# Patient Record
Sex: FEMALE | NOT HISPANIC OR LATINO | Employment: UNEMPLOYED | URBAN - METROPOLITAN AREA
[De-identification: names, ages, dates, MRNs, and addresses within clinical notes are randomized per-mention and may not be internally consistent; named-entity substitution may affect disease eponyms.]

---

## 2023-10-10 ENCOUNTER — TELEPHONE (OUTPATIENT)
Dept: DATA CONVERSION | Facility: HOSPITAL | Age: 33
End: 2023-10-10
Payer: COMMERCIAL

## 2023-10-10 NOTE — TELEPHONE ENCOUNTER
"Pt reports new symptom of \"swooshing\" in both ears intermittently.  States she has experienced this the last 3 days.  Has been taking Diamox only once a day for a week instead of as prescribed BID.  States it's been a crazy week and she has forgotten 2nd dose most days in last week.    No visual disturbances, last check up was 6 months ago.  No headache or other c/o.    She wonders if she needs to be seen before her upcoming January appt.  Thank you.  "

## 2023-12-20 ENCOUNTER — APPOINTMENT (OUTPATIENT)
Dept: PRIMARY CARE | Facility: CLINIC | Age: 33
End: 2023-12-20
Payer: COMMERCIAL

## 2024-01-22 ENCOUNTER — TELEPHONE (OUTPATIENT)
Dept: NEUROLOGY | Facility: HOSPITAL | Age: 34
End: 2024-01-22
Payer: COMMERCIAL

## 2024-01-22 ENCOUNTER — APPOINTMENT (OUTPATIENT)
Dept: NEUROLOGY | Facility: HOSPITAL | Age: 34
End: 2024-01-22

## 2024-01-22 DIAGNOSIS — G93.2 PSEUDOTUMOR CEREBRI: Primary | ICD-10-CM

## 2024-01-22 RX ORDER — ACETAZOLAMIDE 500 MG/1
500 CAPSULE, EXTENDED RELEASE ORAL 2 TIMES DAILY
COMMUNITY
End: 2024-01-22 | Stop reason: SDUPTHER

## 2024-01-22 RX ORDER — ACETAZOLAMIDE 500 MG/1
500 CAPSULE, EXTENDED RELEASE ORAL 2 TIMES DAILY
Qty: 60 CAPSULE | Refills: 0 | Status: SHIPPED | OUTPATIENT
Start: 2024-01-22 | End: 2024-03-14 | Stop reason: SDUPTHER

## 2024-01-22 NOTE — TELEPHONE ENCOUNTER
Pt requests refill of Acetazolamide  mg capsules, 1 cap bid please be sent to [facility].    Thank you.

## 2024-03-08 ENCOUNTER — OFFICE VISIT (OUTPATIENT)
Dept: PRIMARY CARE | Facility: CLINIC | Age: 34
End: 2024-03-08
Payer: COMMERCIAL

## 2024-03-08 VITALS
HEART RATE: 76 BPM | SYSTOLIC BLOOD PRESSURE: 108 MMHG | BODY MASS INDEX: 36.36 KG/M2 | DIASTOLIC BLOOD PRESSURE: 75 MMHG | TEMPERATURE: 97.8 F | OXYGEN SATURATION: 98 % | HEIGHT: 62 IN | RESPIRATION RATE: 18 BRPM | WEIGHT: 197.6 LBS

## 2024-03-08 DIAGNOSIS — R06.2 WHEEZES: ICD-10-CM

## 2024-03-08 DIAGNOSIS — Z13.220 SCREENING, LIPID: ICD-10-CM

## 2024-03-08 DIAGNOSIS — Z13.29 THYROID DISORDER SCREEN: ICD-10-CM

## 2024-03-08 DIAGNOSIS — R00.2 PALPITATIONS: ICD-10-CM

## 2024-03-08 DIAGNOSIS — G93.2 PSEUDOTUMOR CEREBRI: ICD-10-CM

## 2024-03-08 DIAGNOSIS — F41.1 GAD (GENERALIZED ANXIETY DISORDER): Primary | ICD-10-CM

## 2024-03-08 PROCEDURE — 99205 OFFICE O/P NEW HI 60 MIN: CPT | Performed by: NURSE PRACTITIONER

## 2024-03-08 PROCEDURE — 4004F PT TOBACCO SCREEN RCVD TLK: CPT | Performed by: NURSE PRACTITIONER

## 2024-03-08 PROCEDURE — 93000 ELECTROCARDIOGRAM COMPLETE: CPT | Performed by: NURSE PRACTITIONER

## 2024-03-08 RX ORDER — ACETAZOLAMIDE 500 MG/1
CAPSULE, EXTENDED RELEASE ORAL
COMMUNITY
Start: 2024-01-22 | End: 2024-03-14 | Stop reason: SDUPTHER

## 2024-03-08 RX ORDER — METHYLPREDNISOLONE 4 MG/1
TABLET ORAL
Qty: 21 TABLET | Refills: 0 | Status: SHIPPED | OUTPATIENT
Start: 2024-03-08 | End: 2024-03-20 | Stop reason: WASHOUT

## 2024-03-08 ASSESSMENT — ENCOUNTER SYMPTOMS
ACTIVITY CHANGE: 0
PALPITATIONS: 0
SLEEP DISTURBANCE: 0
DIZZINESS: 0
MYALGIAS: 0
ABDOMINAL PAIN: 0
CONFUSION: 0
ARTHRALGIAS: 0
APNEA: 0
SPEECH DIFFICULTY: 0
FEVER: 0
CHILLS: 0
COUGH: 1
HEADACHES: 0
VOMITING: 0
SHORTNESS OF BREATH: 0
NAUSEA: 0
CONSTITUTIONAL NEGATIVE: 1
NERVOUS/ANXIOUS: 0
WEAKNESS: 0
SORE THROAT: 0

## 2024-03-08 ASSESSMENT — ANXIETY QUESTIONNAIRES
2. NOT BEING ABLE TO STOP OR CONTROL WORRYING: NEARLY EVERY DAY
7. FEELING AFRAID AS IF SOMETHING AWFUL MIGHT HAPPEN: NEARLY EVERY DAY
6. BECOMING EASILY ANNOYED OR IRRITABLE: NEARLY EVERY DAY
4. TROUBLE RELAXING: NOT AT ALL
5. BEING SO RESTLESS THAT IT IS HARD TO SIT STILL: SEVERAL DAYS
GAD7 TOTAL SCORE: 16
3. WORRYING TOO MUCH ABOUT DIFFERENT THINGS: NEARLY EVERY DAY
IF YOU CHECKED OFF ANY PROBLEMS ON THIS QUESTIONNAIRE, HOW DIFFICULT HAVE THESE PROBLEMS MADE IT FOR YOU TO DO YOUR WORK, TAKE CARE OF THINGS AT HOME, OR GET ALONG WITH OTHER PEOPLE: SOMEWHAT DIFFICULT
1. FEELING NERVOUS, ANXIOUS, OR ON EDGE: NEARLY EVERY DAY

## 2024-03-08 ASSESSMENT — PATIENT HEALTH QUESTIONNAIRE - PHQ9
10. IF YOU CHECKED OFF ANY PROBLEMS, HOW DIFFICULT HAVE THESE PROBLEMS MADE IT FOR YOU TO DO YOUR WORK, TAKE CARE OF THINGS AT HOME, OR GET ALONG WITH OTHER PEOPLE: VERY DIFFICULT
SUM OF ALL RESPONSES TO PHQ9 QUESTIONS 1 AND 2: 1
2. FEELING DOWN, DEPRESSED OR HOPELESS: SEVERAL DAYS
1. LITTLE INTEREST OR PLEASURE IN DOING THINGS: NOT AT ALL

## 2024-03-08 NOTE — PROGRESS NOTES
"Subjective   Patient ID: Medina Mello is a 33 y.o. female who presents for New Patient Visit, Palpitations (Reports for 2 weeks in February ), Heartburn, and Throat Problem (Reports \"clicking\" in throat when she swallows past 2-3 weeks ).    34 yo female patient , new to establish. PMH pseudotumor cerebri.   On diamox  mg BID   Hx of kidney stones, monitoring closely while on diamox  Chronic migraines: managed by neuro. PRN ibuprofen.   Controlled on diamox  Visual aura    Chest pain, heartburn, clicking in my throat when I swallow and palpitations   Pain feels like its under left breast   Symptoms present for approximately 2-3 months however chest pain more so a month  Chest pain has resolved   Heartburn is still there.     Recently treated by urgent care for flu b. Still some congestion                Review of Systems   Constitutional: Negative.  Negative for activity change, chills and fever.   HENT:  Negative for congestion, postnasal drip, sneezing and sore throat.    Respiratory:  Positive for cough. Negative for apnea and shortness of breath.    Cardiovascular:  Negative for chest pain and palpitations.   Gastrointestinal:  Negative for abdominal pain, nausea and vomiting.   Musculoskeletal:  Negative for arthralgias and myalgias.   Neurological:  Negative for dizziness, syncope, speech difficulty, weakness and headaches.   Psychiatric/Behavioral:  Negative for confusion and sleep disturbance. The patient is not nervous/anxious.        Objective   /75   Pulse 76   Temp 36.6 °C (97.8 °F) (Temporal)   Resp 18   Ht 1.575 m (5' 2\")   Wt 89.6 kg (197 lb 9.6 oz)   SpO2 98%   BMI 36.14 kg/m²     Physical Exam  Vitals reviewed.   Constitutional:       Appearance: Normal appearance.   HENT:      Head: Normocephalic.   Cardiovascular:      Rate and Rhythm: Normal rate and regular rhythm.      Pulses: Normal pulses.      Heart sounds: Normal heart sounds.   Pulmonary:      Effort: Pulmonary effort " is normal.      Breath sounds: Normal breath sounds.   Abdominal:      General: Bowel sounds are normal.   Neurological:      Mental Status: She is alert and oriented to person, place, and time.   Psychiatric:         Mood and Affect: Mood normal.         Behavior: Behavior normal.         Assessment/Plan   Problem List Items Addressed This Visit             ICD-10-CM    Pseudotumor cerebri G93.2     Continue diamox   Follow up with neuro            Relevant Orders    CBC and Auto Differential    Comprehensive Metabolic Panel    SOLOMON (generalized anxiety disorder) - Primary F41.1     Continue current poc  Follow up 6 weeks   Psychology referral   Declines meds   Palpitations associated.          Relevant Orders    Referral to Psychology    Wheezes R06.2     Medrol dose saray   Post URI  Call for worsening   Continue albuterol inhaler prn          Relevant Medications    methylPREDNISolone (Medrol Dospak) 4 mg tablets    Palpitations R00.2     Holter monitor.   Patient given phone number to call for appt .         Relevant Orders    Holter or Event Cardiac Monitor    CBC and Auto Differential    Comprehensive Metabolic Panel    Vitamin D 1,25 Dihydroxy (for eval of hypercalcemia)    Vitamin B12    Iron and TIBC    Ferritin    ECG 12 lead (Clinic Performed) (Completed)    Thyroid disorder screen Z13.29     Thyroid screening d/t palpitations         Relevant Orders    TSH with reflex to Free T4 if abnormal     Other Visit Diagnoses         Codes    Screening, lipid     Z13.220    Relevant Orders    Lipid Panel

## 2024-03-08 NOTE — ASSESSMENT & PLAN NOTE
Continue current poc  Follow up 6 weeks   Psychology referral   Declines meds   Palpitations associated.

## 2024-03-11 ENCOUNTER — APPOINTMENT (OUTPATIENT)
Dept: NEUROLOGY | Facility: HOSPITAL | Age: 34
End: 2024-03-11
Payer: COMMERCIAL

## 2024-03-12 ENCOUNTER — LAB (OUTPATIENT)
Dept: LAB | Facility: LAB | Age: 34
End: 2024-03-12
Payer: COMMERCIAL

## 2024-03-12 DIAGNOSIS — Z13.29 THYROID DISORDER SCREEN: ICD-10-CM

## 2024-03-12 DIAGNOSIS — G93.2 PSEUDOTUMOR CEREBRI: ICD-10-CM

## 2024-03-12 DIAGNOSIS — R00.2 PALPITATIONS: ICD-10-CM

## 2024-03-12 DIAGNOSIS — Z13.220 SCREENING, LIPID: ICD-10-CM

## 2024-03-12 LAB
ALBUMIN SERPL BCP-MCNC: 3.7 G/DL (ref 3.4–5)
ALP SERPL-CCNC: 62 U/L (ref 33–110)
ALT SERPL W P-5'-P-CCNC: 12 U/L (ref 7–45)
ANION GAP SERPL CALC-SCNC: 10 MMOL/L (ref 10–20)
AST SERPL W P-5'-P-CCNC: 11 U/L (ref 9–39)
BASOPHILS # BLD AUTO: 0.07 X10*3/UL (ref 0–0.1)
BASOPHILS NFR BLD AUTO: 1 %
BILIRUB SERPL-MCNC: 0.3 MG/DL (ref 0–1.2)
BUN SERPL-MCNC: 14 MG/DL (ref 6–23)
CALCIUM SERPL-MCNC: 8.7 MG/DL (ref 8.6–10.3)
CHLORIDE SERPL-SCNC: 115 MMOL/L (ref 98–107)
CHOLEST SERPL-MCNC: 190 MG/DL (ref 0–199)
CHOLESTEROL/HDL RATIO: 3.3
CO2 SERPL-SCNC: 19 MMOL/L (ref 21–32)
CREAT SERPL-MCNC: 0.63 MG/DL (ref 0.5–1.05)
EGFRCR SERPLBLD CKD-EPI 2021: >90 ML/MIN/1.73M*2
EOSINOPHIL # BLD AUTO: 0.11 X10*3/UL (ref 0–0.7)
EOSINOPHIL NFR BLD AUTO: 1.5 %
ERYTHROCYTE [DISTWIDTH] IN BLOOD BY AUTOMATED COUNT: 17.4 % (ref 11.5–14.5)
FERRITIN SERPL-MCNC: 10 NG/ML (ref 8–150)
GLUCOSE SERPL-MCNC: 85 MG/DL (ref 74–99)
HCT VFR BLD AUTO: 40 % (ref 36–46)
HDLC SERPL-MCNC: 57.3 MG/DL
HGB BLD-MCNC: 11.4 G/DL (ref 12–16)
IMM GRANULOCYTES # BLD AUTO: 0.04 X10*3/UL (ref 0–0.7)
IMM GRANULOCYTES NFR BLD AUTO: 0.6 % (ref 0–0.9)
IRON SATN MFR SERPL: 6 % (ref 25–45)
IRON SERPL-MCNC: 28 UG/DL (ref 35–150)
LDLC SERPL CALC-MCNC: 97 MG/DL
LYMPHOCYTES # BLD AUTO: 2.16 X10*3/UL (ref 1.2–4.8)
LYMPHOCYTES NFR BLD AUTO: 30.1 %
MCH RBC QN AUTO: 23.7 PG (ref 26–34)
MCHC RBC AUTO-ENTMCNC: 28.5 G/DL (ref 32–36)
MCV RBC AUTO: 83 FL (ref 80–100)
MONOCYTES # BLD AUTO: 0.59 X10*3/UL (ref 0.1–1)
MONOCYTES NFR BLD AUTO: 8.2 %
NEUTROPHILS # BLD AUTO: 4.2 X10*3/UL (ref 1.2–7.7)
NEUTROPHILS NFR BLD AUTO: 58.6 %
NON HDL CHOLESTEROL: 133 MG/DL (ref 0–149)
NRBC BLD-RTO: 0 /100 WBCS (ref 0–0)
PLATELET # BLD AUTO: 295 X10*3/UL (ref 150–450)
POTASSIUM SERPL-SCNC: 4.4 MMOL/L (ref 3.5–5.3)
PROT SERPL-MCNC: 6.3 G/DL (ref 6.4–8.2)
RBC # BLD AUTO: 4.82 X10*6/UL (ref 4–5.2)
SODIUM SERPL-SCNC: 140 MMOL/L (ref 136–145)
TIBC SERPL-MCNC: 457 UG/DL (ref 240–445)
TRIGL SERPL-MCNC: 177 MG/DL (ref 0–149)
TSH SERPL-ACNC: 3.43 MIU/L (ref 0.44–3.98)
UIBC SERPL-MCNC: 429 UG/DL (ref 110–370)
VIT B12 SERPL-MCNC: 260 PG/ML (ref 211–911)
VLDL: 35 MG/DL (ref 0–40)
WBC # BLD AUTO: 7.2 X10*3/UL (ref 4.4–11.3)

## 2024-03-12 PROCEDURE — 36415 COLL VENOUS BLD VENIPUNCTURE: CPT

## 2024-03-12 PROCEDURE — 85025 COMPLETE CBC W/AUTO DIFF WBC: CPT

## 2024-03-12 PROCEDURE — 83550 IRON BINDING TEST: CPT

## 2024-03-12 PROCEDURE — 82607 VITAMIN B-12: CPT

## 2024-03-12 PROCEDURE — 84443 ASSAY THYROID STIM HORMONE: CPT

## 2024-03-12 PROCEDURE — 80053 COMPREHEN METABOLIC PANEL: CPT

## 2024-03-12 PROCEDURE — 83540 ASSAY OF IRON: CPT

## 2024-03-12 PROCEDURE — 82652 VIT D 1 25-DIHYDROXY: CPT

## 2024-03-12 PROCEDURE — 80061 LIPID PANEL: CPT

## 2024-03-12 PROCEDURE — 82728 ASSAY OF FERRITIN: CPT

## 2024-03-14 ENCOUNTER — TELEPHONE (OUTPATIENT)
Dept: NEUROLOGY | Facility: HOSPITAL | Age: 34
End: 2024-03-14
Payer: COMMERCIAL

## 2024-03-14 DIAGNOSIS — G93.2 PSEUDOTUMOR CEREBRI: ICD-10-CM

## 2024-03-14 LAB — 1,25(OH)2D3 SERPL-MCNC: 80.1 PG/ML (ref 19.9–79.3)

## 2024-03-14 RX ORDER — ACETAZOLAMIDE 500 MG/1
500 CAPSULE, EXTENDED RELEASE ORAL 2 TIMES DAILY
Qty: 60 CAPSULE | Refills: 0 | Status: SHIPPED | OUTPATIENT
Start: 2024-03-14 | End: 2024-03-20 | Stop reason: SDUPTHER

## 2024-03-14 NOTE — TELEPHONE ENCOUNTER
Pt called for refill of Diamox 500 mg 12 hour cap, take 1 bid, to [facility] [place] please.    Backstory:    This pt was scheduled with Dr Hough for a FU visit 1/22/24 which pt cancelled.  Pt was then rescheduled for 3/11/24, at which time this appt appeared to be incorrect to Dr Hough.  The pt had an initial visit with him back on 1/17/2023 which was only visible in the old system.  So, on 3/4, the 3/11 appt was cancelled b/c it looked like she needed a NPV (per Dr Hough's request).    So yesterday, pt called to reschedule for a NPV with Dr Hough and asked who will be refilling her Diamox.  Trish made an appt with ANN-MARIE Peguero for 3/20.      Can you refill this med for her Marielena?     Thank you!!

## 2024-03-18 ENCOUNTER — ANCILLARY PROCEDURE (OUTPATIENT)
Dept: CARDIOLOGY | Facility: CLINIC | Age: 34
End: 2024-03-18
Payer: COMMERCIAL

## 2024-03-18 DIAGNOSIS — R00.2 PALPITATIONS: ICD-10-CM

## 2024-03-20 ENCOUNTER — OFFICE VISIT (OUTPATIENT)
Dept: NEUROLOGY | Facility: HOSPITAL | Age: 34
End: 2024-03-20
Payer: COMMERCIAL

## 2024-03-20 VITALS
WEIGHT: 196 LBS | DIASTOLIC BLOOD PRESSURE: 77 MMHG | HEART RATE: 72 BPM | SYSTOLIC BLOOD PRESSURE: 115 MMHG | BODY MASS INDEX: 35.85 KG/M2

## 2024-03-20 DIAGNOSIS — R53.82 CHRONIC FATIGUE: ICD-10-CM

## 2024-03-20 DIAGNOSIS — R06.83 SNORING: ICD-10-CM

## 2024-03-20 DIAGNOSIS — G93.2 PSEUDOTUMOR CEREBRI: Primary | ICD-10-CM

## 2024-03-20 PROBLEM — G43.109 MIGRAINE WITH AURA AND WITHOUT STATUS MIGRAINOSUS, NOT INTRACTABLE: Status: ACTIVE | Noted: 2023-01-17

## 2024-03-20 PROCEDURE — 99214 OFFICE O/P EST MOD 30 MIN: CPT | Performed by: NURSE PRACTITIONER

## 2024-03-20 PROCEDURE — 4004F PT TOBACCO SCREEN RCVD TLK: CPT | Performed by: NURSE PRACTITIONER

## 2024-03-20 RX ORDER — ACETAZOLAMIDE 500 MG/1
500 CAPSULE, EXTENDED RELEASE ORAL 2 TIMES DAILY
Qty: 60 CAPSULE | Refills: 11 | Status: SHIPPED | OUTPATIENT
Start: 2024-03-20 | End: 2025-03-20

## 2024-03-20 ASSESSMENT — ENCOUNTER SYMPTOMS
LOSS OF SENSATION IN FEET: 0
OCCASIONAL FEELINGS OF UNSTEADINESS: 0

## 2024-03-20 NOTE — PATIENT INSTRUCTIONS
B12 supplement over the counter 1,000 mcg daily with level 260 and fatigue.   Speak with Steph about other iron deficiency options with inability to tolerate iron supplementation.   Referral placed to neuro-ophthalmology Dr. Rogers for evaluation and input.  Sleep study ordered, they will call you to schedule.   Work on weight loss.   Continue diamox, refills sent.     FU here after testing.

## 2024-03-20 NOTE — PROGRESS NOTES
"SUBJECTIVE    Medina Mello is a 33 y.o. right-handed female who presents with   Chief Complaint   Patient presents with    Migraine        Presents for follow up visit  Visit type: in-clinic visit    HISTORY OF PRESENT ILLNESS    RECAP:  Former patient of Dr. Hough, prior Dr. Dao. Last seen January 2023. Can't remember when diagnosed. After giving birth to son. Started having migraines. When would stand up would have tunnel vision. Went to doctor. Referred to eye doctor, said had \"pressure in eyes\". OCP was stopped, thought to be related to OCP use. Never needed LP. Dx likely pseudotumor cerebri by Dr Quiros in 2015. Was started on acetazolamide and symptoms improved. Got pregnant 2015 shortly after starting acetazolamide. At some point during pregnancy was on cyproheptadine and reglan. Dr Quiros recommended LP but pt refused per note. Did well off diamox during pregnancy. After delivery HA recurred. Restarted on diamox by Edilma Ortiz 2/1/18 after worsening HA and reported \"eye changes\" and \"pressure in the eye\" by eye doctor. Transitioned to Dr Dao in 2018, continued on acetazolamide ER 500mg bid and advised to see Dr Rogers--but never did. 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 Last seen by Dr Dao 4/2020, medication continued. Has had weight gain, ~40 lbs in 7 years. States diet not changed. Has eye doctor in [place], pt doesn't remember name. Thinks she last saw eye doctor \"last year\".     Regarding her HA, in the past, her peripheral vision \"would be gone\". Would see squiggly lines. Lasts 1/2 hr, then would have pounding HA. Helped with acetazolamide. Before acetazolamide was having 3-4x/week HA. Never been on topiramate.     States vision ok now. Hasn't had HA in a while.       Has had kidney stones, out now. Was told in past to drink plenty of fluids. Has IUD.    Stressed importance of regularly seeing eye doctor. Increase fluid intake. Continued " "on acetazolamide 500 mg ER BID. Referred to nutritionist and stressed weight loss.     03/20/24 -     Still taking diamox. No further kidney stones. States very sleepy on diamox since she started it. Chart review also shows iron deficiency and B12 low normal. Hgb 11.4 as well. Discussed this with patient in addition to likely diamox contribution. States she is not sure that she wants to be on Diamox long term. States her and  do have conflict about her sleepiness from the medication and it affects her daily life.     No current headache complaints. Denies any vision loss or changes.     Pt still smoking. Considering quitting.     Has IUD, states no chance of pregnancy.     Some weight loss. 203 --> 196 lb since January 2023.     States her  told her she does snore. Willing to consider KAY evaluation and treatment both for possible fatigue contribution and ? Triggering effect that apneic episodes may have on her CSF pressures.      Initially states she does see eye doctor, but that he has not been available for \"quite some time.\" Unsure of her last visit with him but > 1 year ago.     States she does not have a lot of information about IIH and is unsure of treatment and etiology. Discussed. Reiterated that weight loss may help her condition vs staying on acetazolamide long term.     REVIEW OF SYSTEMS:  10 point review of systems performed and is negative except as noted in the HPI.     Past Medical History:   Diagnosis Date    Chronic headaches     Other conditions influencing health status     No significant past medical history       No relevant family history has been documented for this patient.    Past Surgical History:   Procedure Laterality Date    MR HEAD ANGIO WO IV CONTRAST  2/3/2015    MR HEAD ANGIO WO IV CONTRAST 2/3/2015 [facility]CILL[facility]GAC[facility]    NO PAST SURGERIES      OTHER SURGICAL HISTORY  07/21/2015    Prior Surgical Procedure Not Done       Social History     Substance and Sexual " Activity   Drug Use Never     Tobacco Use: High Risk (3/20/2024)    Patient History     Smoking Tobacco Use: Every Day     Smokeless Tobacco Use: Never     Passive Exposure: Not on file     Alcohol Use: Not on file       Current Outpatient Medications   Medication Instructions    acetaZOLAMIDE (DIAMOX) 500 mg, oral, 2 times daily         OBJECTIVE    /77   Pulse 72   Wt 88.9 kg (196 lb)   BMI 35.85 kg/m²       Physical Exam  Psychiatric:         Speech: Speech normal.       Neurological Exam  Mental Status  Awake, alert and oriented to person, place and time. Recent and remote memory are intact. Speech is normal. Language is fluent with no aphasia. Attention and concentration are normal. Fund of knowledge is appropriate for level of education.    Cranial Nerves  CN II: Right funduscopic exam: disc intact. Left funduscopic exam: disc intact.  CN II-XII grossly intact otherwise.    Motor  Normal muscle bulk throughout. No fasciculations present. Normal muscle tone. No abnormal involuntary movements.  Strength at least antigravity throughout.    Sensory  Light touch is normal in upper and lower extremities.     Coordination  Right: Finger-to-nose normal.Left: Finger-to-nose normal.    Gait  Casual gait is normal including stance, stride, and arm swing.        MR LUMBAR SPINE WO IV CONTRAST 12/28/2021    Narrative  MRN: [id number]  Patient Name: ALEKSANDR VELARDE    STUDY:  MRI L-SPINE WO;  12/28/2021 1:26 pm    INDICATION:  Sciatica, right side Other spondylosis with radiculopathy, lumbar  region  Anesthesia of skin.  Right leg lateral numbness, dorsal right  foot numbness since May 21.    COMPARISON:  None.    ACCESSION NUMBER(S):  [id number]    ORDERING CLINICIAN:  MARILIA BOB    TECHNIQUE:  Sagittal T1, T2, STIR, axial T1 and T2 weighted images of the lumbar  spine were acquired.    FINDINGS:  Alignment: There are 5 lumbar type vertebrae.    Vertebrae/Intervertebral Discs: The vertebral bodies  demonstrate  expected height.The marrow signal is within normal limits. The  intervertebral discs also demonstrate normal signal and morphology.    Conus: The lower thoracic cord appears unremarkable. The conus  terminates at .    No stenoses of the spine from T11-12 through L5-S1 are noted.    The prevertebral and posterior paraspinous soft tissues are  unremarkable.    Impression  Normal lumbar spine MR.      Lab Results   Component Value Date    WBC 7.2 03/12/2024    RBC 4.82 03/12/2024    HGB 11.4 (L) 03/12/2024    HCT 40.0 03/12/2024     03/12/2024     03/12/2024    K 4.4 03/12/2024     (H) 03/12/2024    BUN 14 03/12/2024    CREATININE 0.63 03/12/2024    EGFR >90 03/12/2024    CALCIUM 8.7 03/12/2024    ALKPHOS 62 03/12/2024    AST 11 03/12/2024    ALT 12 03/12/2024    IWDUQQSN12 260 03/12/2024    LDLCALC 97 03/12/2024    CHOL 190 03/12/2024    HDL 57.3 03/12/2024    TRIG 177 (H) 03/12/2024    TSH 3.43 03/12/2024          ASSESSMENT & PLAN  Problem List Items Addressed This Visit       Pseudotumor cerebri - Primary    Overview     Vision/HA current controlled  Never had LP  Cannot be on OCP (has IUD currently)  On acetazolamide ER 500mg bid  Hx kidney stone--discussed, ideally to get off acetazolamide given hx kidney stone, problem is no other alternative medication          B12 supplement over the counter 1,000 mcg daily with level 260 and fatigue.   Speak with PCP about other iron deficiency options with inability to tolerate iron supplementation.   Referral placed to neuro-ophthalmology Dr. Rogers for evaluation and input.  Sleep study ordered, they will call you to schedule.   Work on weight loss.   Continue diamox, refills sent.     FU here after testing.     UpToDate education on pseudotumor cerebri printed and given to patient.     I personally spent 39 minutes today, exclusive of procedures, providing care for this patient, including preparation, face to face time, documentation and  other services such as review of medical records, diagnostic result, patient education, counseling, coordination of care as specified in the encounter.

## 2024-04-29 ENCOUNTER — OFFICE VISIT (OUTPATIENT)
Dept: PRIMARY CARE | Facility: CLINIC | Age: 34
End: 2024-04-29
Payer: COMMERCIAL

## 2024-04-29 VITALS
SYSTOLIC BLOOD PRESSURE: 115 MMHG | HEART RATE: 85 BPM | RESPIRATION RATE: 18 BRPM | TEMPERATURE: 97.7 F | OXYGEN SATURATION: 98 % | DIASTOLIC BLOOD PRESSURE: 74 MMHG | BODY MASS INDEX: 36.1 KG/M2 | WEIGHT: 196.2 LBS | HEIGHT: 62 IN

## 2024-04-29 DIAGNOSIS — K21.9 GASTROESOPHAGEAL REFLUX DISEASE WITHOUT ESOPHAGITIS: ICD-10-CM

## 2024-04-29 DIAGNOSIS — E78.2 ELEVATED TRIGLYCERIDES WITH HIGH CHOLESTEROL: ICD-10-CM

## 2024-04-29 DIAGNOSIS — F41.1 GAD (GENERALIZED ANXIETY DISORDER): ICD-10-CM

## 2024-04-29 DIAGNOSIS — Z13.29 THYROID DISORDER SCREEN: ICD-10-CM

## 2024-04-29 DIAGNOSIS — R00.2 PALPITATIONS: ICD-10-CM

## 2024-04-29 DIAGNOSIS — R06.2 WHEEZES: ICD-10-CM

## 2024-04-29 DIAGNOSIS — D50.8 IRON DEFICIENCY ANEMIA SECONDARY TO INADEQUATE DIETARY IRON INTAKE: Primary | ICD-10-CM

## 2024-04-29 PROCEDURE — 99213 OFFICE O/P EST LOW 20 MIN: CPT | Performed by: NURSE PRACTITIONER

## 2024-04-29 PROCEDURE — 4004F PT TOBACCO SCREEN RCVD TLK: CPT | Performed by: NURSE PRACTITIONER

## 2024-04-29 RX ORDER — OMEPRAZOLE 40 MG/1
40 CAPSULE, DELAYED RELEASE ORAL
Qty: 90 CAPSULE | Refills: 1 | Status: SHIPPED | OUTPATIENT
Start: 2024-04-29 | End: 2024-10-26

## 2024-04-29 RX ORDER — FERROUS SULFATE 325(65) MG
325 TABLET ORAL
Qty: 90 TABLET | Refills: 1 | Status: SHIPPED | OUTPATIENT
Start: 2024-04-29 | End: 2024-10-26

## 2024-04-29 ASSESSMENT — ENCOUNTER SYMPTOMS
HEADACHES: 0
APNEA: 0
VOMITING: 0
ABDOMINAL PAIN: 0
FEVER: 0
SHORTNESS OF BREATH: 0
CONSTITUTIONAL NEGATIVE: 1
WEAKNESS: 0
CONFUSION: 0
ACTIVITY CHANGE: 0
NAUSEA: 0
ARTHRALGIAS: 0
SLEEP DISTURBANCE: 0
NERVOUS/ANXIOUS: 0
MYALGIAS: 0
SPEECH DIFFICULTY: 0
COUGH: 0
DIZZINESS: 0
SORE THROAT: 0
CHILLS: 0
PALPITATIONS: 0

## 2024-04-29 NOTE — PROGRESS NOTES
"Subjective   Patient ID: Medina Mello is a 33 y.o. female who presents for Follow-up (6 week follow up labs ), Anxiety, iron deficiency anemia, Hyperlipidemia, GERD, Palpitations, and Wheezing.    Heartburn: Patient still having episodes of heartburn.  No improvement with over-the-counter antacids    Generalized anxiety disorder: Has not scheduled with psychologist as recommended.    Palpitations: Holter monitor reviewed.  1 run of SVT lasting 5 seconds.  Otherwise unremarkable study  Happens only occasionally.  Given PDF copy of treatment options.    Iron deficiency anemia: Patient with low iron level and low hemoglobin at 11.4    Elevated triglycerides: Discussed trans fats in diet    Wheezing: Upper respiratory infection treated prior to last visit patient had follow-up in office which she still had a lingering cough with wheezing.  Treated with Medrol Dosepak.  Much improvement on today         Review of Systems   Constitutional: Negative.  Negative for activity change, chills and fever.   HENT:  Negative for congestion, postnasal drip, sneezing and sore throat.    Respiratory:  Negative for apnea, cough and shortness of breath.    Cardiovascular:  Negative for chest pain and palpitations.   Gastrointestinal:  Negative for abdominal pain, nausea and vomiting.   Musculoskeletal:  Negative for arthralgias and myalgias.   Neurological:  Negative for dizziness, syncope, speech difficulty, weakness and headaches.   Psychiatric/Behavioral:  Negative for confusion and sleep disturbance. The patient is not nervous/anxious.        Objective   /74   Pulse 85   Temp 36.5 °C (97.7 °F) (Temporal)   Resp 18   Ht 1.575 m (5' 2\")   Wt 89 kg (196 lb 3.2 oz)   SpO2 98%   BMI 35.89 kg/m²     Physical Exam  Vitals reviewed.   Constitutional:       Appearance: She is obese.   HENT:      Head: Normocephalic.   Cardiovascular:      Rate and Rhythm: Normal rate and regular rhythm.      Pulses: Normal pulses.      Heart " sounds: Normal heart sounds.   Pulmonary:      Effort: Pulmonary effort is normal.      Breath sounds: Normal breath sounds.   Neurological:      Mental Status: She is alert and oriented to person, place, and time.   Psychiatric:         Mood and Affect: Mood normal.         Behavior: Behavior normal.         Assessment/Plan   Problem List Items Addressed This Visit             ICD-10-CM    SOLOMON (generalized anxiety disorder) F41.1     Declines meds.  Plans to schedule with psychologist  Call for worsening anxiety, depression, thoughts of suicide         Relevant Orders    Comprehensive Metabolic Panel    RESOLVED: Wheezes R06.2     Resolved.         Palpitations R00.2     Maneuvers to stop SVT, such as bearing down.  Can refer to cardiology if no improvement   Holter reviewed          Thyroid disorder screen Z13.29    Relevant Orders    TSH with reflex to Free T4 if abnormal    Iron deficiency anemia secondary to inadequate dietary iron intake - Primary D50.8     Start ferrous sulfate 325 mg-65 Fe elemental iron 1 p.o. daily  Recheck at next major lab panel  Increase diet with iron rich foods         Relevant Medications    ferrous sulfate, 325 mg ferrous sulfate, tablet    Other Relevant Orders    CBC and Auto Differential    Comprehensive Metabolic Panel    Iron and TIBC    Ferritin    Gastroesophageal reflux disease without esophagitis K21.9     Start omeprazole 40 mg 1 p.o. daily before breakfast  Follow-up as needed  Risk/benefits/side effects discussed  Call for worsening         Relevant Medications    omeprazole (PriLOSEC) 40 mg DR capsule    Other Relevant Orders    Comprehensive Metabolic Panel     Other Visit Diagnoses         Codes    Elevated triglycerides with high cholesterol     E78.2    Relevant Orders    Comprehensive Metabolic Panel    Lipid Panel

## 2024-04-29 NOTE — ASSESSMENT & PLAN NOTE
Start ferrous sulfate 325 mg-65 Fe elemental iron 1 p.o. daily  Recheck at next major lab panel  Increase diet with iron rich foods

## 2024-04-29 NOTE — ASSESSMENT & PLAN NOTE
Declines meds.  Plans to schedule with psychologist  Call for worsening anxiety, depression, thoughts of suicide

## 2024-04-29 NOTE — PATIENT INSTRUCTIONS
Most people with supraventricular tachycardia (SVT) don't need treatment. If the very fast heartbeat happens often or lasts for a long time, your care team may suggest treatment.    Treatment for SVT may include:    Carotid sinus massage. The carotid arteries are the two main blood vessels that send blood to the head and brain. There is one on each side of the neck. During carotid sinus massage, a healthcare professional gently presses on a specific area of the neck by the carotid artery. This makes the body release chemicals that slow the heart rate. Carotid massage should only be done by an experienced healthcare professional. Do not do carotid sinus massage on your own.  Vagal maneuvers. Simple but specific actions such as coughing, bearing down as if passing stool or putting an ice pack on the face can help slow down the heart rate. These actions affect the vagus nerve, which helps control the heartbeat.  Medicines. If SVT happens frequently, medicines may be given to control the heart rate or reset the heart rhythm. It's very important to take the medicine exactly as directed in order to reduce complications.  Cardioversion. Paddles or patches on the chest deliver shocks that reset the heart rhythm. This treatment is generally used when emergency care is needed or when vagal maneuvers and medicines don't work. It's also possible to do cardioversion with medicines.  Catheter ablation. In this treatment, a doctor inserts one or more thin, flexible tubes called catheters through a blood vessel, usually in the groin. Sensors on the tip of the catheter use heat or cold energy to create tiny scars in the heart. The scars block faulty heart signals that cause the irregular heartbeat.  Pacemaker. Rarely, a small device called a pacemaker is needed to help the heart to beat. It stimulates the heart as needed to keep it beating regularly. A pacemaker is placed under the skin near the collarbone in a minor surgery. Wires  connect the device to the heart.

## 2024-04-29 NOTE — ASSESSMENT & PLAN NOTE
Start omeprazole 40 mg 1 p.o. daily before breakfast  Follow-up as needed  Risk/benefits/side effects discussed  Call for worsening

## 2024-04-29 NOTE — ASSESSMENT & PLAN NOTE
Maneuvers to stop SVT, such as bearing down.  Can refer to cardiology if no improvement   Holter reviewed

## 2024-06-20 ENCOUNTER — OFFICE VISIT (OUTPATIENT)
Dept: PRIMARY CARE | Facility: CLINIC | Age: 34
End: 2024-06-20
Payer: COMMERCIAL

## 2024-06-20 VITALS
BODY MASS INDEX: 35.3 KG/M2 | SYSTOLIC BLOOD PRESSURE: 112 MMHG | DIASTOLIC BLOOD PRESSURE: 80 MMHG | WEIGHT: 191.8 LBS | HEIGHT: 62 IN | OXYGEN SATURATION: 98 % | TEMPERATURE: 97.6 F | RESPIRATION RATE: 16 BRPM | HEART RATE: 100 BPM

## 2024-06-20 DIAGNOSIS — K14.3 HYPERTROPHY, PAPILLAE, TONGUE: Primary | ICD-10-CM

## 2024-06-20 PROCEDURE — 4004F PT TOBACCO SCREEN RCVD TLK: CPT | Performed by: NURSE PRACTITIONER

## 2024-06-20 PROCEDURE — 99213 OFFICE O/P EST LOW 20 MIN: CPT | Performed by: NURSE PRACTITIONER

## 2024-06-20 RX ORDER — AMOXICILLIN 500 MG/1
500 TABLET, FILM COATED ORAL
COMMUNITY
Start: 2024-05-05

## 2024-06-20 ASSESSMENT — ENCOUNTER SYMPTOMS
PALPITATIONS: 0
SORE THROAT: 0
WHEEZING: 0
SHORTNESS OF BREATH: 0
CHILLS: 0
TROUBLE SWALLOWING: 0
COUGH: 0
FATIGUE: 0

## 2024-06-20 NOTE — PATIENT INSTRUCTIONS
Although they might feel uncomfortable, most enlarged papillae usually go away without treatment within a few days. Maintain your oral care routine by brushing twice a day and cleaning between teeth with floss or an interdental device. Allowing the lesions time to heal, rinsing with warm salt water, and staying hydrated might help treat inflamed or enlarged papillae.    If any oral lesion lasts longer than seven days, you should schedule an evaluation with your dental professional. Monitor the lesion's size, color, and location to aid your dental professional with their assessment. If the lesions bleed, become increasingly painful, grow in size, or spread, seek immediate care.    Preventing enlarged papillae starts with identifying the most likely cause. Determine if certain foods irritate your tongue, protect your mouth during sports and other physical activity, and stay aware of your tongue placement during everyday tasks like eating or talking. Quitting smoking and decreasing stress might also reduce the likelihood of enlarged tongue bumps.    That pesky tongue bump might annoy you for a day or two, but don't let it cause worry. Keep an eye on it while maintaining your oral care routine, and you might forget all about it by this time next week!

## 2024-06-20 NOTE — PROGRESS NOTES
"Subjective   Patient ID: Medina Mello is a 34 y.o. female who presents for Follow-up (Spot on her tongue started on 6/6 /2024).    Spots on tongue. First noticed on 6/6/24  Saw dentist on yesterday -dentist not concerned   Area looks better today, seems less inflamed   Smokes daily, hx of using a tongue scraper         Review of Systems   Constitutional:  Negative for chills and fatigue.   HENT:  Negative for congestion, ear discharge, mouth sores, postnasal drip, sore throat and trouble swallowing.    Respiratory:  Negative for cough, shortness of breath and wheezing.    Cardiovascular:  Negative for chest pain and palpitations.       Objective   /80 (BP Location: Left arm, Patient Position: Sitting, BP Cuff Size: Adult long)   Pulse 100   Temp 36.4 °C (97.6 °F) (Temporal)   Resp 16   Ht 1.575 m (5' 2\")   Wt 87 kg (191 lb 12.8 oz)   SpO2 98%   BMI 35.08 kg/m²     Physical Exam  Vitals reviewed.   Constitutional:       Appearance: Normal appearance.   HENT:      Right Ear: Tympanic membrane normal.      Left Ear: Tympanic membrane normal.      Mouth/Throat:      Mouth: Mucous membranes are moist. No oral lesions.      Tongue: No lesions.        Comments: papillae  Cardiovascular:      Rate and Rhythm: Normal rate and regular rhythm.      Pulses: Normal pulses.      Heart sounds: Normal heart sounds.   Neurological:      Mental Status: She is alert.         Assessment/Plan   Problem List Items Addressed This Visit             ICD-10-CM    Hypertrophy, papillae, tongue - Primary K14.3     Although they might feel uncomfortable, most enlarged papillae usually go away without treatment within a few days. Maintain your oral care routine by brushing twice a day and cleaning between teeth with floss or an interdental device. Allowing the lesions time to heal, rinsing with warm salt water, and staying hydrated might help treat inflamed or enlarged papillae.    If any oral lesion lasts longer than seven days, " you should schedule an evaluation with your dental professional. Monitor the lesion's size, color, and location to aid your dental professional with their assessment. If the lesions bleed, become increasingly painful, grow in size, or spread, seek immediate care.    Preventing enlarged papillae starts with identifying the most likely cause. Determine if certain foods irritate your tongue, protect your mouth during sports and other physical activity, and stay aware of your tongue placement during everyday tasks like eating or talking. Quitting smoking and decreasing stress might also reduce the likelihood of enlarged tongue bumps.    That pesky tongue bump might annoy you for a day or two, but don't let it cause worry. Keep an eye on it while maintaining your oral care routine, and you might forget all about it by this time next week!

## 2024-06-24 ENCOUNTER — APPOINTMENT (OUTPATIENT)
Dept: PRIMARY CARE | Facility: CLINIC | Age: 34
End: 2024-06-24
Payer: COMMERCIAL

## 2024-11-12 ENCOUNTER — APPOINTMENT (OUTPATIENT)
Dept: OBSTETRICS AND GYNECOLOGY | Facility: CLINIC | Age: 34
End: 2024-11-12
Payer: COMMERCIAL

## 2024-11-26 ENCOUNTER — APPOINTMENT (OUTPATIENT)
Dept: CARDIOLOGY | Facility: HOSPITAL | Age: 34
End: 2024-11-26
Payer: COMMERCIAL

## 2025-01-16 ENCOUNTER — APPOINTMENT (OUTPATIENT)
Dept: PRIMARY CARE | Facility: CLINIC | Age: 35
End: 2025-01-16
Payer: COMMERCIAL

## 2025-01-23 ENCOUNTER — APPOINTMENT (OUTPATIENT)
Dept: PRIMARY CARE | Facility: CLINIC | Age: 35
End: 2025-01-23
Payer: COMMERCIAL

## 2025-02-10 ENCOUNTER — APPOINTMENT (OUTPATIENT)
Dept: OBSTETRICS AND GYNECOLOGY | Facility: CLINIC | Age: 35
End: 2025-02-10
Payer: COMMERCIAL

## 2025-04-29 ENCOUNTER — APPOINTMENT (OUTPATIENT)
Dept: PRIMARY CARE | Facility: CLINIC | Age: 35
End: 2025-04-29
Payer: COMMERCIAL

## 2025-05-06 ENCOUNTER — APPOINTMENT (OUTPATIENT)
Dept: OBSTETRICS AND GYNECOLOGY | Facility: CLINIC | Age: 35
End: 2025-05-06
Payer: COMMERCIAL

## 2025-05-14 DIAGNOSIS — G93.2 PSEUDOTUMOR CEREBRI: ICD-10-CM

## 2025-05-14 RX ORDER — ACETAZOLAMIDE 500 MG/1
500 CAPSULE, EXTENDED RELEASE ORAL 2 TIMES DAILY
Qty: 60 CAPSULE | Refills: 2 | Status: SHIPPED | OUTPATIENT
Start: 2025-05-14 | End: 2025-08-12

## 2025-05-14 NOTE — TELEPHONE ENCOUNTER
Rx Refill Request Telephone Encounter    Name:  Medina Mello  :  [id number]  Medication Name:  acetazolamide 500mg bid            Specific Pharmacy location:  [facility]   Date of last appointment:  3/20/2024    Date of next appointment:  Visit date not found    Best number to reach patient:      No follow up scheduled.    I did leave her a message asking if she ever saw Dr. Rogers.  And you canceled the sleep study correct?

## 2025-07-30 ENCOUNTER — OFFICE VISIT (OUTPATIENT)
Dept: NEUROLOGY | Facility: HOSPITAL | Age: 35
End: 2025-07-30
Payer: COMMERCIAL